# Patient Record
Sex: MALE | Race: WHITE | NOT HISPANIC OR LATINO
[De-identification: names, ages, dates, MRNs, and addresses within clinical notes are randomized per-mention and may not be internally consistent; named-entity substitution may affect disease eponyms.]

---

## 2017-02-26 ENCOUNTER — RESULT REVIEW (OUTPATIENT)
Age: 72
End: 2017-02-26

## 2017-02-27 ENCOUNTER — OUTPATIENT (OUTPATIENT)
Dept: OUTPATIENT SERVICES | Facility: HOSPITAL | Age: 72
LOS: 1 days | End: 2017-02-27
Payer: MEDICARE

## 2017-02-27 DIAGNOSIS — K86.89 OTHER SPECIFIED DISEASES OF PANCREAS: ICD-10-CM

## 2017-02-27 DIAGNOSIS — K83.2 PERFORATION OF BILE DUCT: ICD-10-CM

## 2017-02-27 PROCEDURE — 74328 X-RAY BILE DUCT ENDOSCOPY: CPT

## 2017-02-27 PROCEDURE — 43261 ENDO CHOLANGIOPANCREATOGRAPH: CPT

## 2017-02-27 PROCEDURE — C1889: CPT

## 2017-02-27 PROCEDURE — C1769: CPT

## 2017-02-27 PROCEDURE — 43277 ERCP EA DUCT/AMPULLA DILATE: CPT

## 2017-02-27 PROCEDURE — 43242 EGD US FINE NEEDLE BX/ASPIR: CPT

## 2017-03-01 ENCOUNTER — TRANSCRIPTION ENCOUNTER (OUTPATIENT)
Age: 72
End: 2017-03-01

## 2017-03-02 LAB — SURGICAL PATHOLOGY STUDY: SIGNIFICANT CHANGE UP

## 2017-03-20 ENCOUNTER — RESULT REVIEW (OUTPATIENT)
Age: 72
End: 2017-03-20

## 2017-03-21 ENCOUNTER — OUTPATIENT (OUTPATIENT)
Dept: OUTPATIENT SERVICES | Facility: HOSPITAL | Age: 72
LOS: 1 days | End: 2017-03-21

## 2017-03-21 DIAGNOSIS — D3A.8 OTHER BENIGN NEUROENDOCRINE TUMORS: ICD-10-CM

## 2017-03-21 DIAGNOSIS — K86.2 CYST OF PANCREAS: ICD-10-CM

## 2017-03-21 LAB — SURGICAL PATHOLOGY STUDY: SIGNIFICANT CHANGE UP

## 2017-03-28 ENCOUNTER — APPOINTMENT (OUTPATIENT)
Dept: NUCLEAR MEDICINE | Facility: CLINIC | Age: 72
End: 2017-03-28

## 2017-03-28 ENCOUNTER — OUTPATIENT (OUTPATIENT)
Dept: OUTPATIENT SERVICES | Facility: HOSPITAL | Age: 72
LOS: 1 days | End: 2017-03-28

## 2017-03-28 DIAGNOSIS — Z00.8 ENCOUNTER FOR OTHER GENERAL EXAMINATION: ICD-10-CM

## 2017-03-29 ENCOUNTER — APPOINTMENT (OUTPATIENT)
Dept: SURGICAL ONCOLOGY | Facility: CLINIC | Age: 72
End: 2017-03-29

## 2017-09-11 ENCOUNTER — RESULT REVIEW (OUTPATIENT)
Age: 72
End: 2017-09-11

## 2017-09-11 ENCOUNTER — OUTPATIENT (OUTPATIENT)
Dept: OUTPATIENT SERVICES | Facility: HOSPITAL | Age: 72
LOS: 1 days | End: 2017-09-11
Payer: MEDICARE

## 2017-09-11 DIAGNOSIS — C24.1 MALIGNANT NEOPLASM OF AMPULLA OF VATER: ICD-10-CM

## 2017-09-11 PROCEDURE — 43259 EGD US EXAM DUODENUM/JEJUNUM: CPT

## 2017-09-11 PROCEDURE — 43239 EGD BIOPSY SINGLE/MULTIPLE: CPT | Mod: XS

## 2017-09-12 LAB — SURGICAL PATHOLOGY STUDY: SIGNIFICANT CHANGE UP

## 2018-08-21 ENCOUNTER — TRANSCRIPTION ENCOUNTER (OUTPATIENT)
Age: 73
End: 2018-08-21

## 2018-08-27 ENCOUNTER — OUTPATIENT (OUTPATIENT)
Dept: OUTPATIENT SERVICES | Facility: HOSPITAL | Age: 73
LOS: 1 days | End: 2018-08-27

## 2018-08-27 ENCOUNTER — APPOINTMENT (OUTPATIENT)
Dept: NUCLEAR MEDICINE | Facility: CLINIC | Age: 73
End: 2018-08-27
Payer: MEDICARE

## 2018-08-27 DIAGNOSIS — C7A.8 OTHER MALIGNANT NEUROENDOCRINE TUMORS: ICD-10-CM

## 2018-08-27 PROCEDURE — 78816 PET IMAGE W/CT FULL BODY: CPT | Mod: 26,PS

## 2018-09-12 ENCOUNTER — APPOINTMENT (OUTPATIENT)
Dept: NUCLEAR MEDICINE | Facility: CLINIC | Age: 73
End: 2018-09-12

## 2018-09-17 ENCOUNTER — APPOINTMENT (OUTPATIENT)
Dept: HEMATOLOGY ONCOLOGY | Facility: CLINIC | Age: 73
End: 2018-09-17

## 2019-11-16 ENCOUNTER — TRANSCRIPTION ENCOUNTER (OUTPATIENT)
Age: 74
End: 2019-11-16

## 2020-11-27 ENCOUNTER — APPOINTMENT (OUTPATIENT)
Dept: NUCLEAR MEDICINE | Facility: CLINIC | Age: 75
End: 2020-11-27
Payer: MEDICARE

## 2020-11-27 ENCOUNTER — OUTPATIENT (OUTPATIENT)
Dept: OUTPATIENT SERVICES | Facility: HOSPITAL | Age: 75
LOS: 1 days | End: 2020-11-27

## 2020-11-27 DIAGNOSIS — Z00.8 ENCOUNTER FOR OTHER GENERAL EXAMINATION: ICD-10-CM

## 2020-11-27 PROCEDURE — 78815 PET IMAGE W/CT SKULL-THIGH: CPT | Mod: 26,PS

## 2020-12-15 ENCOUNTER — TRANSCRIPTION ENCOUNTER (OUTPATIENT)
Age: 75
End: 2020-12-15

## 2020-12-21 ENCOUNTER — TRANSCRIPTION ENCOUNTER (OUTPATIENT)
Age: 75
End: 2020-12-21

## 2021-01-08 ENCOUNTER — TRANSCRIPTION ENCOUNTER (OUTPATIENT)
Age: 76
End: 2021-01-08

## 2021-01-27 ENCOUNTER — TRANSCRIPTION ENCOUNTER (OUTPATIENT)
Age: 76
End: 2021-01-27

## 2021-01-29 ENCOUNTER — APPOINTMENT (OUTPATIENT)
Dept: PULMONOLOGY | Facility: CLINIC | Age: 76
End: 2021-01-29

## 2022-06-01 ENCOUNTER — APPOINTMENT (OUTPATIENT)
Dept: NUCLEAR MEDICINE | Facility: CLINIC | Age: 77
End: 2022-06-01
Payer: MEDICARE

## 2022-06-01 ENCOUNTER — OUTPATIENT (OUTPATIENT)
Dept: OUTPATIENT SERVICES | Facility: HOSPITAL | Age: 77
LOS: 1 days | End: 2022-06-01

## 2022-06-01 DIAGNOSIS — D3A.8 OTHER BENIGN NEUROENDOCRINE TUMORS: ICD-10-CM

## 2022-06-01 PROCEDURE — 78815 PET IMAGE W/CT SKULL-THIGH: CPT | Mod: 26,PS,MH

## 2023-12-14 ENCOUNTER — APPOINTMENT (OUTPATIENT)
Dept: NUCLEAR MEDICINE | Facility: CLINIC | Age: 78
End: 2023-12-14

## 2023-12-20 ENCOUNTER — OUTPATIENT (OUTPATIENT)
Dept: OUTPATIENT SERVICES | Facility: HOSPITAL | Age: 78
LOS: 1 days | End: 2023-12-20
Payer: MEDICARE

## 2023-12-20 ENCOUNTER — APPOINTMENT (OUTPATIENT)
Dept: NUCLEAR MEDICINE | Facility: CLINIC | Age: 78
End: 2023-12-20

## 2023-12-20 DIAGNOSIS — D3A.8 OTHER BENIGN NEUROENDOCRINE TUMORS: ICD-10-CM

## 2023-12-20 PROCEDURE — 78816 PET IMAGE W/CT FULL BODY: CPT | Mod: 26,KX

## 2025-03-24 ENCOUNTER — EMERGENCY (EMERGENCY)
Facility: HOSPITAL | Age: 80
LOS: 0 days | Discharge: HOME | End: 2025-03-25
Attending: STUDENT IN AN ORGANIZED HEALTH CARE EDUCATION/TRAINING PROGRAM
Payer: MEDICARE

## 2025-03-24 VITALS
OXYGEN SATURATION: 98 % | RESPIRATION RATE: 16 BRPM | SYSTOLIC BLOOD PRESSURE: 104 MMHG | DIASTOLIC BLOOD PRESSURE: 68 MMHG | WEIGHT: 175.05 LBS | TEMPERATURE: 98 F | HEIGHT: 74 IN | HEART RATE: 51 BPM

## 2025-03-24 DIAGNOSIS — I10 ESSENTIAL (PRIMARY) HYPERTENSION: ICD-10-CM

## 2025-03-24 DIAGNOSIS — Z86.018 PERSONAL HISTORY OF OTHER BENIGN NEOPLASM: ICD-10-CM

## 2025-03-24 DIAGNOSIS — R55 SYNCOPE AND COLLAPSE: ICD-10-CM

## 2025-03-24 DIAGNOSIS — Z85.46 PERSONAL HISTORY OF MALIGNANT NEOPLASM OF PROSTATE: ICD-10-CM

## 2025-03-24 DIAGNOSIS — Z91.040 LATEX ALLERGY STATUS: ICD-10-CM

## 2025-03-24 DIAGNOSIS — Z88.2 ALLERGY STATUS TO SULFONAMIDES: ICD-10-CM

## 2025-03-24 DIAGNOSIS — E78.5 HYPERLIPIDEMIA, UNSPECIFIED: ICD-10-CM

## 2025-03-24 PROCEDURE — 99283 EMERGENCY DEPT VISIT LOW MDM: CPT

## 2025-03-24 PROCEDURE — 99284 EMERGENCY DEPT VISIT MOD MDM: CPT

## 2025-03-24 PROCEDURE — 93005 ELECTROCARDIOGRAM TRACING: CPT

## 2025-03-24 PROCEDURE — 82962 GLUCOSE BLOOD TEST: CPT

## 2025-03-24 NOTE — ED ADULT TRIAGE NOTE - CHIEF COMPLAINT QUOTE
Pt BIBEMS from [facility] s/p near syncopal episode. Per EMS, upon their arrival, pt went to the bathroom to have a BM. No acute distress noted upon assessment. Pt has no complaints at this time.

## 2025-03-25 VITALS
DIASTOLIC BLOOD PRESSURE: 79 MMHG | HEART RATE: 61 BPM | SYSTOLIC BLOOD PRESSURE: 146 MMHG | OXYGEN SATURATION: 99 % | RESPIRATION RATE: 18 BRPM | TEMPERATURE: 98 F

## 2025-03-25 PROCEDURE — 93010 ELECTROCARDIOGRAM REPORT: CPT

## 2025-03-25 NOTE — ED ADULT NURSE NOTE - OBJECTIVE STATEMENT
Pt BIBEMS from [facility] s/p near syncopal episode. Per EMS, upon their arrival, pt went to the bathroom to have a BM. No acute distress noted upon assessment. Pt has no complaints at this time. .

## 2025-03-25 NOTE — ED PROVIDER NOTE - CLINICAL SUMMARY MEDICAL DECISION MAKING FREE TEXT BOX
Patient presents to the ER for an episode of near syncope.  States his symptoms have completely resolved and that he feels "great ".  Patient mildly bradycardic on arrival.  States that he is always bradycardic and follows with his cardiologist Dr. Alston.  States he had a Holter monitor last week no events.  States that he had a full cardiac workup about 4 weeks ago with Dr. Alston and that everything was found to be normal.  Patient states that he feels well and would like to go home at this time.  I strongly advised and recommended to the patient to have blood work, chest x-ray done in the ER.  Patient declines.  He is agreeable to EKG and fingerstick.  Heart rate improved to 70s without intervention.  Fingerstick 143.  EKG showing.  In-depth discussion of risks and benefits of medical workup discussed with patient.  Patient verbalizes understanding of risk and benefits.  Patient has autonomy in full decision-making capacity.  Alert and oriented x 4.  Wife is at bedside who is in agreement and also would like to decline full medical workup.  Patient states that he has good follow-up with his cardiologist and PCP.  Advised to follow-up with cardiologist this week and return to the ED if any new or worsening symptoms.  Strict return precautions given. Ambulating independently in ED. Tolerating PO. Stable for dc. Patient verbalizes understanding of return precautions and f/u. Patient presents to the ER for an episode of near syncope.  States his symptoms have completely resolved and that he feels "great ".  Patient mildly bradycardic on arrival.  States that he is always bradycardic and follows with his cardiologist Dr. Alston.  States he had a Holter monitor last week no events.  States that he had a full cardiac workup about 4 weeks ago with Dr. Alston and that everything was found to be normal.  Patient states that he feels well and would like to go home at this time.  I strongly advised and recommended to the patient to have blood work, chest x-ray done in the ER.  Patient declines.  He is agreeable to EKG and fingerstick.  Heart rate improved to 70s without intervention.  Fingerstick 143.  EKG showing sinus bradycardia with PACs that he states he was told by his cardiologist.  In-depth discussion of risks and benefits of medical workup discussed with patient.  Patient verbalizes understanding of risk and benefits.  Patient has autonomy in full decision-making capacity.  Alert and oriented x 4.  Wife is at bedside who is in agreement and also would like to decline full medical workup.  Patient states that he has good follow-up with his cardiologist and PCP.  Advised to follow-up with cardiologist this week and return to the ED if any new or worsening symptoms.  Strict return precautions given. Ambulating independently in ED. Tolerating PO. Stable for dc. Patient verbalizes understanding of return precautions and f/u.

## 2025-03-25 NOTE — ED ADULT NURSE NOTE - CHIEF COMPLAINT QUOTE
Pt BIBEMS from [facility] s/p near syncopal episode. Per EMS, upon their arrival, pt went to the bathroom to have a BM. No acute distress noted upon assessment. Pt has no complaints at this time.

## 2025-03-25 NOTE — ED PROVIDER NOTE - PATIENT PORTAL LINK FT
You can access the FollowMyHealth Patient Portal offered by [facility] by registering at the following website: [email or web address]. By joining [employer]’s FollowMyHealth portal, you will also be able to view your health information using other applications (apps) compatible with our system.

## 2025-03-25 NOTE — ED PROVIDER NOTE - OBJECTIVE STATEMENT
80-year-old male with history of prostate cancer, melanoma, neuroendocrine tumor, hypertension, hyperlipidemia, hemochromatosis presents to the ER for feeling like he is going to pass out.  Patient was at [facility] providing blessings for [employer] members over several hours.  States he began at 5 PM.  Has not eaten or drinking since he began.  States that the blessings required him to kneel and to stand up frequently.  States that afterwards he stood up felt very flush, lightheaded, nauseous.  Wife states that he became pale.  Patient went to the bathroom and had a bowel movement and felt immediate improvement in symptoms.  EMS reported low blood pressure that is resolved on arrival to the ER.  Patient denies chest pain, shortness of breath, lower extremity edema.  Did not pass out or injure himself.  Patient states that this has happened in the past after similar events.

## 2025-03-25 NOTE — ED ADULT NURSE NOTE - NSFALLHARMRISKINTERV_ED_ALL_ED
